# Patient Record
Sex: FEMALE | Race: WHITE | NOT HISPANIC OR LATINO | Employment: OTHER | URBAN - METROPOLITAN AREA
[De-identification: names, ages, dates, MRNs, and addresses within clinical notes are randomized per-mention and may not be internally consistent; named-entity substitution may affect disease eponyms.]

---

## 2017-01-13 ENCOUNTER — IMPORTED ENCOUNTER (OUTPATIENT)
Dept: URBAN - METROPOLITAN AREA CLINIC 50 | Facility: CLINIC | Age: 52
End: 2017-01-13

## 2017-03-30 NOTE — PATIENT DISCUSSION
SARCOIDOSIS : DISCUSSED RISK OF UVITIS DUE TO DIAGNOSIS, ADVISED PATIENT IF LIGHT SENSITIVITY OR DISCOMFORT OF THE EYE IF EVER NOTICE COME INTO THE OFFICE TO KEEP HEALTH OF THE EYE.

## 2017-03-30 NOTE — PATIENT DISCUSSION
PTOSIS OU: I have discussed with the patient that this is a condition that occurs when one or both upper eyelids droop and can fall over the pupil. Ptosis is usually caused by stretching or thinning of the muscle that raises the eyelid. This can lead to loss of peripheral vision. The risks of the procedure include but are not limited to pain, bleeding, infection, further surgery, scarring, injury to eye, dry eye syndrome, and lid asymmetry. Significant swelling and bruising after surgery should be expected. Margin distance is one. The patient was instructed to use ice and heat following the procedure, as well as artificial tears to reduce these symptoms. The patient understands and elects toWAIT with ptosis repair.

## 2017-03-30 NOTE — PATIENT DISCUSSION
PTOSIS OU VISUALLY SIGNIFICANT AT THIS TIME , PATIENT WISHES TO RESEARCH PRIOR TO SCHEDULING  . RETURN FOR FOLLOW-UP AS SCHEDULED.

## 2017-03-30 NOTE — PATIENT DISCUSSION
DRY EYES : Discussed with patient the importance of keeping the eye moist and the symptoms associated with dry eyes including blurry vision, tearing, burning, and jessica sensation. Advised patient to minimize use of any fans blowing directly on the face. Advised patient to continue with artificial tears 2-3 times daily.

## 2018-02-16 ENCOUNTER — IMPORTED ENCOUNTER (OUTPATIENT)
Dept: URBAN - METROPOLITAN AREA CLINIC 50 | Facility: CLINIC | Age: 53
End: 2018-02-16

## 2018-10-02 NOTE — PATIENT DISCUSSION
DRY EYES : Discussed with patient the importance of keeping the eye moist and the symptoms associated with dry eyes including blurry vision, tearing, burning, and jessica sensation. Tear Lab was performed today to evaluate the severity of dryness. Advised patient to minimize use of any fans blowing directly on the face. Advised patient to continue with over the counter artificial tears 2-3 times daily.

## 2019-01-03 NOTE — PATIENT DISCUSSION
SUBCONJUNCTIVAL HEMORRHAGE:  I have explained that this is a totally benign condition that will resolve spontaneously within a week or two. No treatment is indicated.

## 2019-01-03 NOTE — PATIENT DISCUSSION
PINGUECULA OU: Educated patient about diagnosis. Stressed the use of UV protection to help prevent further growth. Artificial tears as needed to keep the eyes lubricated. Educated about the possibility of inflammation that could lead to pingueculitis.

## 2019-01-03 NOTE — PATIENT DISCUSSION
DRY EYE COUNSELING:  I have explained that dry eye syndrome may cause many ocular symptoms including irritation, burning, tearing, and blurry vision. Frequent high quality artificial tears will help relieve these symptoms.

## 2019-05-03 ENCOUNTER — IMPORTED ENCOUNTER (OUTPATIENT)
Dept: URBAN - METROPOLITAN AREA CLINIC 50 | Facility: CLINIC | Age: 54
End: 2019-05-03

## 2021-01-26 ENCOUNTER — APPOINTMENT (RX ONLY)
Dept: URBAN - METROPOLITAN AREA CLINIC 86 | Facility: CLINIC | Age: 56
Setting detail: DERMATOLOGY
End: 2021-01-26

## 2021-01-26 DIAGNOSIS — L85.3 XEROSIS CUTIS: ICD-10-CM

## 2021-01-26 DIAGNOSIS — I87.2 VENOUS INSUFFICIENCY (CHRONIC) (PERIPHERAL): ICD-10-CM | Status: INADEQUATELY CONTROLLED

## 2021-01-26 PROCEDURE — ? TREATMENT REGIMEN

## 2021-01-26 PROCEDURE — 99204 OFFICE O/P NEW MOD 45 MIN: CPT

## 2021-01-26 PROCEDURE — ? COUNSELING

## 2021-01-26 PROCEDURE — ? ADDITIONAL NOTES

## 2021-01-26 PROCEDURE — ? PRESCRIPTION

## 2021-01-26 RX ORDER — TRIAMCINOLONE ACETONIDE 1 MG/G
OINTMENT TOPICAL
Qty: 1 | Refills: 0 | Status: ERX | COMMUNITY
Start: 2021-01-26

## 2021-01-26 RX ADMIN — TRIAMCINOLONE ACETONIDE: 1 OINTMENT TOPICAL at 00:00

## 2021-01-26 ASSESSMENT — LOCATION SIMPLE DESCRIPTION DERM
LOCATION SIMPLE: LEFT PRETIBIAL REGION
LOCATION SIMPLE: RIGHT PRETIBIAL REGION
LOCATION SIMPLE: RIGHT PRETIBIAL REGION
LOCATION SIMPLE: LEFT PRETIBIAL REGION
LOCATION SIMPLE: LEFT FOOT
LOCATION SIMPLE: LEFT FOOT
LOCATION SIMPLE: RIGHT FOOT
LOCATION SIMPLE: RIGHT FOOT

## 2021-01-26 ASSESSMENT — LOCATION ZONE DERM
LOCATION ZONE: FEET
LOCATION ZONE: LEG
LOCATION ZONE: FEET
LOCATION ZONE: LEG

## 2021-01-26 ASSESSMENT — LOCATION DETAILED DESCRIPTION DERM
LOCATION DETAILED: RIGHT DISTAL PRETIBIAL REGION
LOCATION DETAILED: RIGHT DISTAL PRETIBIAL REGION
LOCATION DETAILED: RIGHT DORSAL FOOT
LOCATION DETAILED: LEFT DISTAL PRETIBIAL REGION
LOCATION DETAILED: RIGHT DORSAL FOOT
LOCATION DETAILED: LEFT DISTAL PRETIBIAL REGION
LOCATION DETAILED: LEFT DORSAL FOOT
LOCATION DETAILED: LEFT DORSAL FOOT

## 2021-01-26 NOTE — PROCEDURE: ADDITIONAL NOTES
Render Risk Assessment In Note?: no
Additional Notes: Leg edema has decreased with the start of dialysis.
Detail Level: Generalized

## 2021-01-26 NOTE — PROCEDURE: TREATMENT REGIMEN
Plan: Advised to be fitted for compression stockings and elevate legs as much as possible.
Initiate Treatment: triamcinolone acetonide 0.1 % topical ointment
Detail Level: Zone
Otc Regimen: Moisturize and apply Amlactin daily

## 2021-04-17 ASSESSMENT — VISUAL ACUITY
OS_CC: 20/20-1
OD_CC: 20/25
OS_CC: J1
OD_CC: 20/20-1
OD_CC: J1
OS_CC: 20/25
OS_CC: J1+
OS_CC: 20/20
OD_CC: 20/20
OD_CC: J1+

## 2021-04-17 ASSESSMENT — TONOMETRY
OD_IOP_MMHG: 15
OS_IOP_MMHG: 15
OD_IOP_MMHG: 15
OD_IOP_MMHG: 14
OS_IOP_MMHG: 14
OS_IOP_MMHG: 13

## 2021-10-28 ENCOUNTER — PREPPED CHART (OUTPATIENT)
Dept: URBAN - METROPOLITAN AREA CLINIC 50 | Facility: CLINIC | Age: 56
End: 2021-10-28

## 2021-10-28 ENCOUNTER — ROUTINE EXAM (OUTPATIENT)
Dept: URBAN - METROPOLITAN AREA CLINIC 50 | Facility: CLINIC | Age: 56
End: 2021-10-28

## 2021-10-28 DIAGNOSIS — H25.13: ICD-10-CM

## 2021-10-28 DIAGNOSIS — Z79.4: ICD-10-CM

## 2021-10-28 DIAGNOSIS — Z01.01: ICD-10-CM

## 2021-10-28 DIAGNOSIS — E11.3293: ICD-10-CM

## 2021-10-28 PROCEDURE — 92015 DETERMINE REFRACTIVE STATE: CPT

## 2021-10-28 PROCEDURE — 92014 COMPRE OPH EXAM EST PT 1/>: CPT

## 2021-10-28 ASSESSMENT — VISUAL ACUITY
OS_GLARE: 20/40
OD_SC: 20/40
OS_PH: 20/25
OD_PH: 20/30-2
OU_SC: J2
OD_GLARE: 20/30
OD_GLARE: 20/40
OS_SC: 20/40
OS_GLARE: 20/30

## 2021-10-28 ASSESSMENT — TONOMETRY
OS_IOP_MMHG: 14
OD_IOP_MMHG: 14

## 2022-04-14 ENCOUNTER — COMPREHENSIVE EXAM (OUTPATIENT)
Dept: URBAN - METROPOLITAN AREA CLINIC 50 | Facility: CLINIC | Age: 57
End: 2022-04-14

## 2022-04-14 DIAGNOSIS — H52.4: ICD-10-CM

## 2022-04-14 DIAGNOSIS — E11.3213: ICD-10-CM

## 2022-04-14 DIAGNOSIS — H25.13: ICD-10-CM

## 2022-04-14 PROCEDURE — 92015 DETERMINE REFRACTIVE STATE: CPT

## 2022-04-14 PROCEDURE — 92014 COMPRE OPH EXAM EST PT 1/>: CPT

## 2022-04-14 PROCEDURE — 92134 CPTRZ OPH DX IMG PST SGM RTA: CPT

## 2022-04-14 ASSESSMENT — VISUAL ACUITY
OD_SC: J4
OU_SC: J3
OD_CC: 20/30-1
OS_SC: 20/25
OD_SC: 20/25
OS_CC: 20/30-2
OS_SC: J3

## 2022-04-14 ASSESSMENT — TONOMETRY
OS_IOP_MMHG: 14
OD_IOP_MMHG: 14

## 2022-04-14 NOTE — PATIENT DISCUSSION
Mild edema evident on todays exam OS>>OD. Recommended baseline evaluation by [facility], will send referral to Dr. Matias Bradley.

## 2022-10-27 ENCOUNTER — ESTABLISHED PATIENT (OUTPATIENT)
Dept: URBAN - METROPOLITAN AREA CLINIC 50 | Facility: CLINIC | Age: 57
End: 2022-10-27

## 2022-10-27 DIAGNOSIS — H52.4: ICD-10-CM

## 2022-10-27 DIAGNOSIS — H25.13: ICD-10-CM

## 2022-10-27 DIAGNOSIS — E11.3213: ICD-10-CM

## 2022-10-27 DIAGNOSIS — Z79.4: ICD-10-CM

## 2022-10-27 PROCEDURE — 92134 CPTRZ OPH DX IMG PST SGM RTA: CPT

## 2022-10-27 PROCEDURE — 92014 COMPRE OPH EXAM EST PT 1/>: CPT

## 2022-10-27 PROCEDURE — 92015 DETERMINE REFRACTIVE STATE: CPT

## 2022-10-27 ASSESSMENT — VISUAL ACUITY
OU_SC: 20/40
OD_SC: 20/40
OS_SC: 20/40
OS_GLARE: 20/50
OD_GLARE: 20/50
OD_GLARE: 20/30
OS_GLARE: 20/25
OS_PH: 20/25-1
OD_PH: 20/30
OU_CC: J1+@14IN

## 2022-10-27 ASSESSMENT — TONOMETRY
OD_IOP_MMHG: 14
OS_IOP_MMHG: 14

## 2022-10-27 NOTE — PATIENT DISCUSSION
Patient is already established w/ Dr. Matias Bradley and sees him routinely. It is only necessary for her to see Dr. Jose Zarate once a year.

## 2022-10-27 NOTE — PATIENT DISCUSSION
Patient is currently on dialysis, causing fluctuating blood sugar levels. She is switching to another method (Hemo) to bring her blood sugar down in December.

## 2023-11-16 ENCOUNTER — COMPREHENSIVE EXAM (OUTPATIENT)
Dept: URBAN - METROPOLITAN AREA CLINIC 50 | Facility: LOCATION | Age: 58
End: 2023-11-16

## 2023-11-16 DIAGNOSIS — Z79.4: ICD-10-CM

## 2023-11-16 DIAGNOSIS — H52.4: ICD-10-CM

## 2023-11-16 DIAGNOSIS — E11.3213: ICD-10-CM

## 2023-11-16 DIAGNOSIS — Z01.01: ICD-10-CM

## 2023-11-16 PROCEDURE — 92015 DETERMINE REFRACTIVE STATE: CPT

## 2023-11-16 PROCEDURE — 92014 COMPRE OPH EXAM EST PT 1/>: CPT

## 2023-11-16 PROCEDURE — 92134 CPTRZ OPH DX IMG PST SGM RTA: CPT

## 2023-11-16 ASSESSMENT — VISUAL ACUITY
OS_CC: 20/40
OD_GLARE: 20/40
OD_SC: 20/40
OS_SC: 20/40
OS_GLARE: 20/40
OD_CC: 20/40
OD_GLARE: 20/70
OS_GLARE: 20/70

## 2023-11-16 ASSESSMENT — TONOMETRY
OD_IOP_MMHG: 14
OS_IOP_MMHG: 12

## 2024-10-17 ENCOUNTER — APPOINTMENT (RX ONLY)
Dept: URBAN - METROPOLITAN AREA CLINIC 86 | Facility: CLINIC | Age: 59
Setting detail: DERMATOLOGY
End: 2024-10-17

## 2024-10-17 DIAGNOSIS — B07.8 OTHER VIRAL WARTS: ICD-10-CM

## 2024-10-17 PROCEDURE — 17110 DESTRUCTION B9 LES UP TO 14: CPT

## 2024-10-17 PROCEDURE — ? BENIGN DESTRUCTION

## 2024-10-17 ASSESSMENT — LOCATION ZONE DERM: LOCATION ZONE: NECK

## 2024-10-17 ASSESSMENT — LOCATION DETAILED DESCRIPTION DERM
LOCATION DETAILED: LEFT INFERIOR ANTERIOR NECK
LOCATION DETAILED: LEFT CLAVICULAR NECK

## 2024-10-17 ASSESSMENT — LOCATION SIMPLE DESCRIPTION DERM: LOCATION SIMPLE: LEFT ANTERIOR NECK

## 2024-10-17 NOTE — PROCEDURE: BENIGN DESTRUCTION
Anesthesia Type: 1% Xylocaine with [id number] epinephrine and sodium bicarbonate
Post-Care Instructions: I reviewed with the patient in detail post-care instructions. Patient is to wear sunprotection, and avoid picking at any of the treated lesions. Pt may apply Vaseline to crusted or scabbing areas.
Render Post-Care Instructions In Note?: no
Medical Necessity Information: It is in your best interest to select a reason for this procedure from the list below. All of these items fulfill various CMS LCD requirements except the new and changing color options.
Consent: The patient's consent was obtained including but not limited to risks of crusting, scabbing, blistering, scarring, darker or lighter pigmentary change, recurrence, incomplete removal and infection.
Medical Necessity Clause: This procedure was medically necessary because the lesions that were treated were:
Anesthesia Volume In Cc: 0.2
Treatment Number (Will Not Render If 0): 0
Detail Level: Detailed

## 2025-07-30 ENCOUNTER — COMPREHENSIVE EXAM (OUTPATIENT)
Age: 60
End: 2025-07-30

## 2025-07-30 DIAGNOSIS — H52.4: ICD-10-CM

## 2025-07-30 DIAGNOSIS — Z01.01: ICD-10-CM

## 2025-07-30 PROCEDURE — 92014 COMPRE OPH EXAM EST PT 1/>: CPT

## 2025-07-30 PROCEDURE — 92015 DETERMINE REFRACTIVE STATE: CPT
